# Patient Record
Sex: FEMALE | Race: WHITE | Employment: FULL TIME | URBAN - METROPOLITAN AREA
[De-identification: names, ages, dates, MRNs, and addresses within clinical notes are randomized per-mention and may not be internally consistent; named-entity substitution may affect disease eponyms.]

---

## 2017-04-29 ENCOUNTER — HOSPITAL ENCOUNTER (OUTPATIENT)
Age: 42
Discharge: HOME | End: 2017-04-29
Attending: FAMILY MEDICINE
Payer: COMMERCIAL

## 2017-04-29 ENCOUNTER — APPOINTMENT (OUTPATIENT)
Dept: GENERAL RADIOLOGY | Age: 42
End: 2017-04-29
Attending: FAMILY MEDICINE
Payer: COMMERCIAL

## 2017-04-29 VITALS
DIASTOLIC BLOOD PRESSURE: 76 MMHG | TEMPERATURE: 98 F | SYSTOLIC BLOOD PRESSURE: 122 MMHG | RESPIRATION RATE: 19 BRPM | HEART RATE: 87 BPM | BODY MASS INDEX: 20.89 KG/M2 | WEIGHT: 130 LBS | OXYGEN SATURATION: 100 % | HEIGHT: 66 IN

## 2017-04-29 DIAGNOSIS — M77.50 TENDONITIS OF ANKLE: Primary | ICD-10-CM

## 2017-04-29 PROCEDURE — 99203 OFFICE O/P NEW LOW 30 MIN: CPT

## 2017-04-29 PROCEDURE — 99204 OFFICE O/P NEW MOD 45 MIN: CPT

## 2017-04-29 PROCEDURE — 73610 X-RAY EXAM OF ANKLE: CPT

## 2017-04-29 RX ORDER — NAPROXEN 500 MG/1
500 TABLET ORAL 2 TIMES DAILY PRN
Qty: 30 TABLET | Refills: 0 | Status: SHIPPED | OUTPATIENT
Start: 2017-04-29 | End: 2017-05-06

## 2017-04-29 NOTE — ED INITIAL ASSESSMENT (HPI)
Patient is here with pain in her left ankle that started a few weeks ago and was on and off. Today she states it really hurts, she denies any injury.

## 2017-04-29 NOTE — ED PROVIDER NOTES
Patient Seen in: [address]    History   Patient presents with:  Lower Extremity Injury (musculoskeletal)    Stated Complaint: left ankle injury    Patient is a 38year old female presenting with foot injury.  The history All other systems reviewed and negative except as noted above. PSFH elements reviewed from today and agreed except as otherwise stated in HPI.     Physical Exam       ED Triage Vitals   BP 04/29/17 1510 122/76 mmHg   Pulse 04/29/17 1510 87   Resp 04/29/1 Qty: 30 tablet Refills: 0

## 2017-09-01 ENCOUNTER — HOSPITAL ENCOUNTER (OUTPATIENT)
Age: 42
Discharge: HOME | End: 2017-09-01
Attending: FAMILY MEDICINE
Payer: COMMERCIAL

## 2017-09-01 VITALS
SYSTOLIC BLOOD PRESSURE: 123 MMHG | WEIGHT: 130 LBS | BODY MASS INDEX: 23.04 KG/M2 | HEIGHT: 63 IN | TEMPERATURE: 99 F | HEART RATE: 78 BPM | RESPIRATION RATE: 16 BRPM | DIASTOLIC BLOOD PRESSURE: 76 MMHG | OXYGEN SATURATION: 99 %

## 2017-09-01 DIAGNOSIS — H60.331 ACUTE SWIMMER'S EAR OF RIGHT SIDE: Primary | ICD-10-CM

## 2017-09-01 PROCEDURE — 99213 OFFICE O/P EST LOW 20 MIN: CPT

## 2017-09-01 RX ORDER — CIPROFLOXACIN AND DEXAMETHASONE 3; 1 MG/ML; MG/ML
4 SUSPENSION/ DROPS AURICULAR (OTIC) 2 TIMES DAILY
Qty: 5 ML | Refills: 0 | Status: SHIPPED | OUTPATIENT
Start: 2017-09-01 | End: 2017-09-08

## 2017-09-01 NOTE — ED INITIAL ASSESSMENT (HPI)
Pt here to IC with c/o right ear pain, pain started today. NO fevers at home. Resp easy and regular. NO c/o sore throat.

## 2017-09-01 NOTE — ED PROVIDER NOTES
Patient Seen in: [address]    History   No chief complaint on file. Stated Complaint: Ear Problem    Pt p/w co R ear pain, sore to touch for about 4-5 hours, today; no uri sx's, admits to q tip use.       Per Rn:  Pt All other systems reviewed and negative except as noted above. PSFH elements reviewed from today and agreed except as otherwise stated in HPI.     Physical Exam   ED Triage Vitals [09/01/17 1836]  BP: 123/76  Pulse: 78  Resp: 16  Temp: 98.8 °F (37.1 (CIPRODEX) 0.3-0.1 % Otic Suspension  Place 4 drops into the right ear 2 (two) times daily.   Qty: 5 mL Refills: 0

## 2019-05-06 PROBLEM — E78.01 HETEROZYGOUS FAMILIAL HYPERCHOLESTEROLEMIA: Status: ACTIVE | Noted: 2019-05-06

## 2019-11-22 ENCOUNTER — OFFICE VISIT (OUTPATIENT)
Dept: ENDOCRINOLOGY CLINIC | Facility: CLINIC | Age: 44
End: 2019-11-22
Payer: COMMERCIAL

## 2019-11-22 VITALS — HEART RATE: 80 BPM | SYSTOLIC BLOOD PRESSURE: 126 MMHG | DIASTOLIC BLOOD PRESSURE: 87 MMHG

## 2019-11-22 DIAGNOSIS — E07.9 THYROID DISEASE: Primary | ICD-10-CM

## 2019-11-22 PROCEDURE — 99203 OFFICE O/P NEW LOW 30 MIN: CPT | Performed by: INTERNAL MEDICINE

## 2019-11-22 NOTE — PROGRESS NOTES
Name: Marybel Barriga  Date: 11/22/2019    Referring Physician: No ref. provider found    Patient presents with:  Consult: PT would like to establish care with provider for thyroid issues. Pt reports fatigue, weght gain, Anxiety.        HISTORY OF PRESENT [place] ONE TIME DAILY , Disp: 30 tablet, Rfl: 2  •  Evolocumab (REPATHA) 140 MG/ML Subcutaneous Solution Prefilled Syringe, Inject 140 mg into the skin every 14 (fourteen) days. , Disp: 6 Syringe, Rfl: 3  •  NATURE-THROID 65 MG Oral Tab, Take 65 mg by mouth daily. dermatologist  - Unclear if true thyroid disease  - She was treated for a short time period but then stopped medication which is not typical treatment course  - Check TSH, FT4, Total T3, TPO Ab  - Further management based on above results  - Will likely co

## 2019-11-27 ENCOUNTER — APPOINTMENT (OUTPATIENT)
Dept: LAB | Facility: HOSPITAL | Age: 44
End: 2019-11-27
Attending: INTERNAL MEDICINE
Payer: COMMERCIAL

## 2019-11-27 DIAGNOSIS — E07.9 THYROID DISEASE: ICD-10-CM

## 2019-11-27 PROCEDURE — 86376 MICROSOMAL ANTIBODY EACH: CPT

## 2019-11-27 PROCEDURE — 84439 ASSAY OF FREE THYROXINE: CPT

## 2019-11-27 PROCEDURE — 84443 ASSAY THYROID STIM HORMONE: CPT

## 2019-11-27 PROCEDURE — 36415 COLL VENOUS BLD VENIPUNCTURE: CPT

## 2019-11-27 PROCEDURE — 84480 ASSAY TRIIODOTHYRONINE (T3): CPT

## 2020-11-06 ENCOUNTER — OFFICE VISIT (OUTPATIENT)
Dept: OBGYN CLINIC | Facility: CLINIC | Age: 45
End: 2020-11-06
Payer: COMMERCIAL

## 2020-11-06 VITALS
DIASTOLIC BLOOD PRESSURE: 72 MMHG | BODY MASS INDEX: 24.99 KG/M2 | WEIGHT: 135.81 LBS | SYSTOLIC BLOOD PRESSURE: 108 MMHG | HEIGHT: 62 IN

## 2020-11-06 DIAGNOSIS — Z12.31 SCREENING MAMMOGRAM, ENCOUNTER FOR: ICD-10-CM

## 2020-11-06 DIAGNOSIS — Z01.419 WELL WOMAN EXAM WITH ROUTINE GYNECOLOGICAL EXAM: Primary | ICD-10-CM

## 2020-11-06 PROCEDURE — 99386 PREV VISIT NEW AGE 40-64: CPT | Performed by: OBSTETRICS & GYNECOLOGY

## 2020-11-06 PROCEDURE — 99072 ADDL SUPL MATRL&STAF TM PHE: CPT | Performed by: OBSTETRICS & GYNECOLOGY

## 2020-11-06 PROCEDURE — 3078F DIAST BP <80 MM HG: CPT | Performed by: OBSTETRICS & GYNECOLOGY

## 2020-11-06 PROCEDURE — 3074F SYST BP LT 130 MM HG: CPT | Performed by: OBSTETRICS & GYNECOLOGY

## 2020-11-06 PROCEDURE — 90686 IIV4 VACC NO PRSV 0.5 ML IM: CPT | Performed by: OBSTETRICS & GYNECOLOGY

## 2020-11-06 PROCEDURE — 3008F BODY MASS INDEX DOCD: CPT | Performed by: OBSTETRICS & GYNECOLOGY

## 2020-11-06 PROCEDURE — 90471 IMMUNIZATION ADMIN: CPT | Performed by: OBSTETRICS & GYNECOLOGY

## 2020-11-06 NOTE — PROGRESS NOTES
Annual Gyn Exam    HPI  Pt is a transfer from Dr. Pardeep Leal who has retired. She is here for an annual gynecologic evaluation.       OB History    Para Term  AB Living   3 3       3   SAB TAB Ectopic Multiple Live Births           3      # Out (10 mg total) by mouth daily. , Disp: 30 tablet, Rfl: 6  •  Sertraline HCl 50 MG Oral Tab, , Disp: , Rfl:     Review of Systems   Constitutional: Negative for chills, fatigue and fever. HENT: Negative for hearing loss.     Eyes: Negative for visual disturb normal.     /72   Ht 5' 2\" (1.575 m)   Wt 135 lb 12.8 oz (61.6 kg)   LMP 10/28/2020 (Exact Date)   BMI 24.84 kg/m²     Assessment and Plan  1.  Well woman exam with routine gynecological exam  A: 50 y.o.  here for annual gynecologic evaluation  E

## 2022-02-25 ENCOUNTER — LAB ENCOUNTER (OUTPATIENT)
Dept: LAB | Facility: HOSPITAL | Age: 47
End: 2022-02-25
Attending: INTERNAL MEDICINE
Payer: COMMERCIAL

## 2022-02-25 DIAGNOSIS — E78.01 HETEROZYGOUS FAMILIAL HYPERCHOLESTEROLEMIA: ICD-10-CM

## 2022-02-25 LAB
ALBUMIN SERPL-MCNC: 3.9 G/DL (ref 3.4–5)
ALP LIVER SERPL-CCNC: 67 U/L
ALT SERPL-CCNC: 29 U/L
AST SERPL-CCNC: 19 U/L (ref 15–37)
BILIRUB DIRECT SERPL-MCNC: 0.1 MG/DL (ref 0–0.2)
BILIRUB SERPL-MCNC: 0.5 MG/DL (ref 0.1–2)
CHOLEST SERPL-MCNC: 203 MG/DL (ref ?–200)
FASTING PATIENT LIPID ANSWER: YES
HDLC SERPL-MCNC: 77 MG/DL (ref 40–59)
LDLC SERPL CALC-MCNC: 116 MG/DL (ref ?–100)
NONHDLC SERPL-MCNC: 126 MG/DL (ref ?–130)
PROT SERPL-MCNC: 7.7 G/DL (ref 6.4–8.2)
TRIGL SERPL-MCNC: 57 MG/DL (ref 30–149)
VLDLC SERPL CALC-MCNC: 10 MG/DL (ref 0–30)

## 2022-02-25 PROCEDURE — 80061 LIPID PANEL: CPT

## 2022-02-25 PROCEDURE — 36415 COLL VENOUS BLD VENIPUNCTURE: CPT

## 2022-02-25 PROCEDURE — 80076 HEPATIC FUNCTION PANEL: CPT

## 2023-11-17 ENCOUNTER — OFFICE VISIT (OUTPATIENT)
Dept: OBGYN CLINIC | Facility: CLINIC | Age: 48
End: 2023-11-17
Payer: COMMERCIAL

## 2023-11-17 VITALS
WEIGHT: 134 LBS | BODY MASS INDEX: 24.66 KG/M2 | SYSTOLIC BLOOD PRESSURE: 152 MMHG | HEIGHT: 62 IN | DIASTOLIC BLOOD PRESSURE: 90 MMHG | HEART RATE: 93 BPM

## 2023-11-17 DIAGNOSIS — Z12.4 SCREENING FOR CERVICAL CANCER: Primary | ICD-10-CM

## 2023-11-17 DIAGNOSIS — N95.1 PERI-MENOPAUSE: ICD-10-CM

## 2023-11-17 DIAGNOSIS — R03.0 BLOOD PRESSURE ELEVATED WITHOUT HISTORY OF HTN: ICD-10-CM

## 2023-11-17 DIAGNOSIS — Z01.419 ENCOUNTER FOR WELL WOMAN EXAM WITH ROUTINE GYNECOLOGICAL EXAM: ICD-10-CM

## 2023-11-17 PROCEDURE — 3008F BODY MASS INDEX DOCD: CPT | Performed by: OBSTETRICS & GYNECOLOGY

## 2023-11-17 PROCEDURE — 3077F SYST BP >= 140 MM HG: CPT | Performed by: OBSTETRICS & GYNECOLOGY

## 2023-11-17 PROCEDURE — 99396 PREV VISIT EST AGE 40-64: CPT | Performed by: OBSTETRICS & GYNECOLOGY

## 2023-11-17 PROCEDURE — 3080F DIAST BP >= 90 MM HG: CPT | Performed by: OBSTETRICS & GYNECOLOGY

## 2023-11-17 RX ORDER — NALTREXONE HYDROCHLORIDE AND BUPROPION HYDROCHLORIDE 8; 90 MG/1; MG/1
TABLET, EXTENDED RELEASE ORAL
COMMUNITY

## 2023-11-17 RX ORDER — AZELAIC ACID 0.15 G/G
GEL TOPICAL
COMMUNITY

## 2023-11-20 LAB — HPV I/H RISK 1 DNA SPEC QL NAA+PROBE: NEGATIVE

## 2023-12-15 ENCOUNTER — LAB ENCOUNTER (OUTPATIENT)
Dept: LAB | Facility: HOSPITAL | Age: 48
End: 2023-12-15
Attending: INTERNAL MEDICINE
Payer: COMMERCIAL

## 2023-12-15 DIAGNOSIS — E78.00 HYPERCHOLESTEROLEMIA: Primary | ICD-10-CM

## 2023-12-15 PROCEDURE — 36415 COLL VENOUS BLD VENIPUNCTURE: CPT

## 2023-12-15 PROCEDURE — 83695 ASSAY OF LIPOPROTEIN(A): CPT

## 2023-12-15 PROCEDURE — 82172 ASSAY OF APOLIPOPROTEIN: CPT

## 2023-12-17 LAB — APOLIPOPROTEIN B: 121 MG/DL

## 2023-12-18 LAB — LIPOPROTEIN (A): 13.1 NMOL/L

## 2024-01-03 ENCOUNTER — ORDER TRANSCRIPTION (OUTPATIENT)
Dept: ADMINISTRATIVE | Facility: HOSPITAL | Age: 49
End: 2024-01-03

## 2024-01-03 DIAGNOSIS — Z13.6 SCREENING FOR HEART DISEASE: Primary | ICD-10-CM

## 2024-01-25 ENCOUNTER — HOSPITAL ENCOUNTER (OUTPATIENT)
Dept: CT IMAGING | Facility: HOSPITAL | Age: 49
Discharge: HOME | End: 2024-01-25
Attending: FAMILY MEDICINE

## 2024-01-25 DIAGNOSIS — Z13.6 SCREENING FOR HEART DISEASE: ICD-10-CM

## 2024-01-25 LAB
POCT GLUCOSE CHOLESTECH: 75 (ref 70–99)
POCT HDL: 100 (ref 55–80)
POCT LDL: 0 (ref 0–99)
POCT TOTAL CHOLESTEROL: 259 (ref 110–200)
POCT TRIGLYCERIDES: 46 (ref 1–149)

## 2024-01-26 NOTE — PROGRESS NOTES
Date of Service 1/25/2024    JUAQUIN MONTANEZ  Date of Birth 1/15/1975    Patient Age: 49 year old    PCP: Shanta Field  [address] [facility]  [address] [place] [id number]    Heart Scan Consult  Preliminary Heart Scan Score: 20.5    Previous Screening  Heart Scan Completed Previously: Yes  Year of last heart scan: 12/30/2016  Score of last heart scan: 0.0  Peripheral Vascular Scan Completed Previously: No          Risk Factors  Personal Risk Factors  Non-alterable Risk Factors: Family History (Father had MI and Bypass at 59 yo)  Alterable Risk Factors: Abnormal Cholesterol      Body Mass Index  BMI 23    Blood Pressure  /80 no medication.  (Normal =< 120/80,  Elevated = 120-129/ >80,  High Stage1 130-139/80-89 , Stage2 >140/>90)    Lipid Profile  Patient was in fasting state: No    Cholesterol: 259, done on 1/25/2024.  HDL Cholesterol: 100, done on 1/25/2024.  LDL Cholesterol: 0, done on 1/25/2024.  TriGlycerides 46, done on 1/25/2024.    HDL was >100, because it was not an actual number, it could not calculate the LDL - I did it manually and the LDL would be around 150.  She is on a statin - 40mg.  She had also been on zetia with the statin but did not tolerate the Zetia.    Cholesterol Goals  Value   Total  =< 200   HDL  = > 45 Men = > 55 Women   LDL   =< 100   Triglycerides  =< 150       Glucose and Hemoglobin A1C  No results found for: \"GLU\", \"A1C\"  (Normal Fasting Glucose < 100mg/dl )    Nurse Review  Risk factor information and results reviewed with Nurse: Yes    Recommended Follow Up:  Consult your physician regarding:: Final Heart Scan Report;Discuss potential for Incidental Finding      Recommendations for Change:  Nutrition Changes: Low Saturated Fat;Increase Fiber         Exercise: Enhance Current Program    Smoking Cessation: No Change Needed    Weight Management: Maintain Current Weight    Stress Management: Adopt Stress Management Techniques    Repeat Heart Scan: 3 Years if Calcium Score is >  0.0;Discuss with your Physician              [place] Recommended Resources:  Recommended Resources: PV Screening;Upcoming Classes, Medical Services and Health Library [email or web address]  Recommended PV Screening: Abdomen;Carotids         Natacha GE RN        Please Contact the Nurse Heart Line with any Questions or Concerns [phone].

## 2024-06-21 ENCOUNTER — LAB ENCOUNTER (OUTPATIENT)
Dept: LAB | Facility: HOSPITAL | Age: 49
End: 2024-06-21
Attending: INTERNAL MEDICINE

## 2024-06-21 DIAGNOSIS — E78.00 PURE HYPERCHOLESTEROLEMIA: Primary | ICD-10-CM

## 2024-06-21 LAB
ALT SERPL-CCNC: 11 U/L
AST SERPL-CCNC: 16 U/L (ref ?–34)
CHOLEST SERPL-MCNC: 383 MG/DL (ref ?–200)
FASTING PATIENT LIPID ANSWER: YES
HDLC SERPL-MCNC: 105 MG/DL (ref 40–59)
LDLC SERPL CALC-MCNC: 272 MG/DL (ref ?–100)
NONHDLC SERPL-MCNC: 278 MG/DL (ref ?–130)
TRIGL SERPL-MCNC: 59 MG/DL (ref 30–149)
VLDLC SERPL CALC-MCNC: 14 MG/DL (ref 0–30)

## 2024-06-21 PROCEDURE — 36415 COLL VENOUS BLD VENIPUNCTURE: CPT

## 2024-06-21 PROCEDURE — 84450 TRANSFERASE (AST) (SGOT): CPT

## 2024-06-21 PROCEDURE — 80061 LIPID PANEL: CPT

## 2024-06-21 PROCEDURE — 84460 ALANINE AMINO (ALT) (SGPT): CPT

## 2024-10-11 ENCOUNTER — LAB ENCOUNTER (OUTPATIENT)
Dept: LAB | Facility: HOSPITAL | Age: 49
End: 2024-10-11
Attending: PHYSICIAN ASSISTANT
Payer: COMMERCIAL

## 2024-10-11 DIAGNOSIS — E78.00 HYPERCHOLESTEROLEMIA: Primary | ICD-10-CM

## 2024-10-11 LAB
ALT SERPL-CCNC: 14 U/L
AST SERPL-CCNC: 25 U/L (ref ?–34)
CHOLEST SERPL-MCNC: 344 MG/DL (ref ?–200)
FASTING PATIENT LIPID ANSWER: YES
HDLC SERPL-MCNC: 95 MG/DL (ref 40–59)
LDLC SERPL CALC-MCNC: 240 MG/DL (ref ?–100)
NONHDLC SERPL-MCNC: 249 MG/DL (ref ?–130)
TRIGL SERPL-MCNC: 66 MG/DL (ref 30–149)
VLDLC SERPL CALC-MCNC: 15 MG/DL (ref 0–30)

## 2024-10-11 PROCEDURE — 80061 LIPID PANEL: CPT

## 2024-10-11 PROCEDURE — 36415 COLL VENOUS BLD VENIPUNCTURE: CPT

## 2024-10-11 PROCEDURE — 84460 ALANINE AMINO (ALT) (SGPT): CPT

## 2024-10-11 PROCEDURE — 84450 TRANSFERASE (AST) (SGOT): CPT

## 2025-02-28 ENCOUNTER — LAB ENCOUNTER (OUTPATIENT)
Dept: LAB | Facility: HOSPITAL | Age: 50
End: 2025-02-28
Attending: STUDENT IN AN ORGANIZED HEALTH CARE EDUCATION/TRAINING PROGRAM
Payer: COMMERCIAL

## 2025-02-28 DIAGNOSIS — E78.00 PURE HYPERCHOLESTEROLEMIA: Primary | ICD-10-CM

## 2025-02-28 LAB
CHOLEST SERPL-MCNC: 223 MG/DL (ref ?–200)
FASTING PATIENT LIPID ANSWER: YES
HDLC SERPL-MCNC: 100 MG/DL (ref 40–59)
LDLC SERPL CALC-MCNC: 113 MG/DL (ref ?–100)
NONHDLC SERPL-MCNC: 123 MG/DL (ref ?–130)
TRIGL SERPL-MCNC: 56 MG/DL (ref 30–149)
VLDLC SERPL CALC-MCNC: 10 MG/DL (ref 0–30)

## 2025-02-28 PROCEDURE — 82172 ASSAY OF APOLIPOPROTEIN: CPT

## 2025-02-28 PROCEDURE — 80061 LIPID PANEL: CPT

## 2025-02-28 PROCEDURE — 36415 COLL VENOUS BLD VENIPUNCTURE: CPT

## 2025-03-02 LAB — APOLIPOPROTEIN B: 84 MG/DL

## 2025-06-06 ENCOUNTER — LAB ENCOUNTER (OUTPATIENT)
Dept: LAB | Facility: HOSPITAL | Age: 50
End: 2025-06-06
Attending: STUDENT IN AN ORGANIZED HEALTH CARE EDUCATION/TRAINING PROGRAM
Payer: COMMERCIAL

## 2025-06-06 DIAGNOSIS — E78.00 PURE HYPERCHOLESTEROLEMIA: Primary | ICD-10-CM

## 2025-06-06 LAB
CHOLEST SERPL-MCNC: 148 MG/DL (ref ?–200)
FASTING PATIENT LIPID ANSWER: YES
HDLC SERPL-MCNC: 80 MG/DL (ref 40–59)
LDLC SERPL CALC-MCNC: 54 MG/DL (ref ?–100)
NONHDLC SERPL-MCNC: 68 MG/DL (ref ?–130)
TRIGL SERPL-MCNC: 69 MG/DL (ref 30–149)
VLDLC SERPL CALC-MCNC: 10 MG/DL (ref 0–30)

## 2025-06-06 PROCEDURE — 36415 COLL VENOUS BLD VENIPUNCTURE: CPT

## 2025-06-06 PROCEDURE — 80061 LIPID PANEL: CPT

## (undated) NOTE — ED AVS SNAPSHOT
[facility] in [address] [address]    Phone:  [phone]    Fax:  [address] [place] [address]   MRN: [id number]    Department:  [facility] Immediate Care in [facility]   Date of Visit:  4/ and physician's office to determine coverage and benefits available for follow-up care and referrals. It is our goal to assure that you are completely satisfied with every aspect of your visit today.   In an effort to constantly improve our service to y Any imaging studies and labs completed today can be reviewed in your MyChart account. You may have had testing done that requires us to contact you. Please make sure we have your correct phone number on file.      OUR CURRENT HOURS OF OPERATION:  MONDAY T and ask to get set up for an insurance coverage that is in-network with [facility].         MyChart     Visit [facility]  You can access your [employer] to more actively manage your health care and view more details from this visit by going to https:/

## (undated) NOTE — MR AVS SNAPSHOT
After Visit Summary   11/6/2020    Cedric Thurman    MRN: [id number]           Visit Information     Date & Time  11/6/2020  1:00 PM Provider  Mary Jane Gustafson MD [address] [place] [address], [address] [place] [address],[address], [facility]  Phone  [phone] y Nancy 5495 ([address] [facility])        [address]          It is the patient's responsibility to check with and follow their insurance company's guidelines for prior authorization for this test.  You may be Treatment for mild illness or injury that does not require immediate attention VIDEO VISITS  Average cost  $35*    e-VISTS  Average cost  $35*     SAME DAY APPOINTMENTS   Available at primary care offices    [address] [place] [address]  OFFICE VISIT   [facility]